# Patient Record
Sex: FEMALE | URBAN - METROPOLITAN AREA
[De-identification: names, ages, dates, MRNs, and addresses within clinical notes are randomized per-mention and may not be internally consistent; named-entity substitution may affect disease eponyms.]

---

## 2022-02-22 PROBLEM — O26.899 RH NEGATIVE STATE IN ANTEPARTUM PERIOD: Status: ACTIVE | Noted: 2022-02-22

## 2022-02-22 PROBLEM — Z67.91 RH NEGATIVE STATE IN ANTEPARTUM PERIOD: Status: ACTIVE | Noted: 2022-02-22

## 2022-02-22 PROBLEM — Z34.00 SUPERVISION OF NORMAL FIRST PREGNANCY, ANTEPARTUM (HCC): Status: ACTIVE | Noted: 2022-02-22

## 2022-02-22 PROBLEM — Z67.91 RH NEGATIVE STATE IN ANTEPARTUM PERIOD (HCC): Status: ACTIVE | Noted: 2022-02-22

## 2022-02-22 PROBLEM — Z34.00 SUPERVISION OF NORMAL FIRST PREGNANCY, ANTEPARTUM: Status: ACTIVE | Noted: 2022-02-22

## 2022-02-22 PROBLEM — O26.899 RH NEGATIVE STATE IN ANTEPARTUM PERIOD (HCC): Status: ACTIVE | Noted: 2022-02-22

## 2022-02-24 PROBLEM — O09.899 RUBELLA NON-IMMUNE STATUS, ANTEPARTUM: Status: ACTIVE | Noted: 2022-02-24

## 2022-02-24 PROBLEM — Z28.39 RUBELLA NON-IMMUNE STATUS, ANTEPARTUM: Status: ACTIVE | Noted: 2022-02-24

## 2022-02-24 PROBLEM — Z28.39 RUBELLA NON-IMMUNE STATUS, ANTEPARTUM (HCC): Status: ACTIVE | Noted: 2022-02-24

## 2022-02-24 PROBLEM — O09.899 RUBELLA NON-IMMUNE STATUS, ANTEPARTUM (HCC): Status: ACTIVE | Noted: 2022-02-24

## 2022-03-23 PROBLEM — U07.1 COVID-19 AFFECTING PREGNANCY, ANTEPARTUM: Status: ACTIVE | Noted: 2022-03-23

## 2022-03-23 PROBLEM — U07.1 COVID-19 AFFECTING PREGNANCY, ANTEPARTUM (HCC): Status: ACTIVE | Noted: 2022-03-23

## 2022-03-23 PROBLEM — O98.519 COVID-19 AFFECTING PREGNANCY, ANTEPARTUM (HCC): Status: ACTIVE | Noted: 2022-03-23

## 2022-03-23 PROBLEM — O98.519 COVID-19 AFFECTING PREGNANCY, ANTEPARTUM: Status: ACTIVE | Noted: 2022-03-23

## 2022-04-21 ENCOUNTER — HOSPITAL ENCOUNTER (OUTPATIENT)
Dept: PERINATAL CARE | Facility: HOSPITAL | Age: 32
Discharge: HOME | End: 2022-04-21
Attending: OBSTETRICS & GYNECOLOGY
Payer: COMMERCIAL

## 2022-04-21 VITALS
DIASTOLIC BLOOD PRESSURE: 76 MMHG | WEIGHT: 182 LBS | HEART RATE: 92 BPM | SYSTOLIC BLOOD PRESSURE: 118 MMHG | BODY MASS INDEX: 29 KG/M2

## 2022-04-21 DIAGNOSIS — O98.519 COVID-19 AFFECTING PREGNANCY, ANTEPARTUM: ICD-10-CM

## 2022-04-21 DIAGNOSIS — O98.519 COVID-19 AFFECTING PREGNANCY, ANTEPARTUM: Primary | ICD-10-CM

## 2022-04-21 DIAGNOSIS — U07.1 COVID-19 AFFECTING PREGNANCY, ANTEPARTUM: ICD-10-CM

## 2022-04-21 DIAGNOSIS — U07.1 COVID-19 AFFECTING PREGNANCY, ANTEPARTUM: Primary | ICD-10-CM

## 2022-04-21 PROCEDURE — 76811 OB US DETAILED SNGL FETUS: CPT | Performed by: OBSTETRICS & GYNECOLOGY

## 2022-07-11 ENCOUNTER — HOSPITAL ENCOUNTER (OUTPATIENT)
Dept: PERINATAL CARE | Facility: HOSPITAL | Age: 32
Discharge: HOME | End: 2022-07-11
Attending: OBSTETRICS & GYNECOLOGY
Payer: COMMERCIAL

## 2022-07-11 ENCOUNTER — HOSPITAL ENCOUNTER (OUTPATIENT)
Dept: PERINATAL CARE | Facility: HOSPITAL | Age: 32
End: 2022-07-11
Attending: OBSTETRICS & GYNECOLOGY
Payer: COMMERCIAL

## 2022-07-11 VITALS
HEART RATE: 91 BPM | WEIGHT: 196 LBS | SYSTOLIC BLOOD PRESSURE: 115 MMHG | BODY MASS INDEX: 31 KG/M2 | DIASTOLIC BLOOD PRESSURE: 76 MMHG

## 2022-07-11 DIAGNOSIS — O98.519 COVID-19 AFFECTING PREGNANCY, ANTEPARTUM: ICD-10-CM

## 2022-07-11 DIAGNOSIS — U07.1 COVID-19 AFFECTING PREGNANCY IN THIRD TRIMESTER: Primary | ICD-10-CM

## 2022-07-11 DIAGNOSIS — O98.513 COVID-19 AFFECTING PREGNANCY IN THIRD TRIMESTER: Primary | ICD-10-CM

## 2022-07-11 DIAGNOSIS — U07.1 COVID-19 AFFECTING PREGNANCY, ANTEPARTUM: ICD-10-CM

## 2022-07-11 PROCEDURE — 76816 OB US FOLLOW-UP PER FETUS: CPT | Performed by: OBSTETRICS & GYNECOLOGY

## 2022-07-11 PROCEDURE — 76819 FETAL BIOPHYS PROFIL W/O NST: CPT

## 2024-01-25 LAB
AMB EXT CHLAMYDIA, NUCLEIC ACID AMP: NOT DETECTED
AMB EXT GONOCOCCUS, NUCLEIC ACID AMP: NOT DETECTED
AMB EXT TREPONEMAL ANTIBODIES: NON REACTIVE
ANTIBODY SCREEN OB: NEGATIVE
HEPATITIS B SURFACE ANTIGEN OB: NEGATIVE
HIV RESULT OB: NEGATIVE
RH FACTOR OB: NEGATIVE

## 2024-06-03 LAB
AMB EXT TREPONEMAL ANTIBODIES: NON REACTIVE
HIV RESULT OB: NEGATIVE

## 2024-07-30 LAB — STREP GP B CULT OB: POSITIVE

## 2024-08-23 ENCOUNTER — TELEPHONE (OUTPATIENT)
Dept: OBGYN UNIT | Facility: HOSPITAL | Age: 34
End: 2024-08-23

## 2024-08-23 VITALS — WEIGHT: 212 LBS | BODY MASS INDEX: 32.89 KG/M2 | HEIGHT: 67.5 IN

## 2024-08-23 RX ORDER — CHOLECALCIFEROL (VITAMIN D3) 25 MCG
1 TABLET,CHEWABLE ORAL DAILY
COMMUNITY

## 2024-08-24 ENCOUNTER — ANESTHESIA EVENT (OUTPATIENT)
Dept: OBGYN UNIT | Facility: HOSPITAL | Age: 34
End: 2024-08-24
Payer: COMMERCIAL

## 2024-08-24 ENCOUNTER — HOSPITAL ENCOUNTER (INPATIENT)
Facility: HOSPITAL | Age: 34
LOS: 2 days | Discharge: HOME | End: 2024-08-26
Attending: OBSTETRICS & GYNECOLOGY | Admitting: OBSTETRICS & GYNECOLOGY
Payer: COMMERCIAL

## 2024-08-24 ENCOUNTER — ANESTHESIA (OUTPATIENT)
Dept: OBGYN UNIT | Facility: HOSPITAL | Age: 34
End: 2024-08-24
Payer: COMMERCIAL

## 2024-08-24 PROBLEM — Z34.90 PREGNANCY (HCC): Status: ACTIVE | Noted: 2024-08-24

## 2024-08-24 LAB
ANTIBODY SCREEN: NEGATIVE
BASOPHILS # BLD AUTO: 0.04 X10(3) UL (ref 0–0.2)
BASOPHILS NFR BLD AUTO: 0.3 %
DEPRECATED RDW RBC AUTO: 39.9 FL (ref 35.1–46.3)
EOSINOPHIL # BLD AUTO: 0.08 X10(3) UL (ref 0–0.7)
EOSINOPHIL NFR BLD AUTO: 0.7 %
ERYTHROCYTE [DISTWIDTH] IN BLOOD BY AUTOMATED COUNT: 13.1 % (ref 11–15)
FETAL SCREEN RESULT: NEGATIVE
HCT VFR BLD AUTO: 35.6 %
HGB BLD-MCNC: 12.1 G/DL
IMM GRANULOCYTES # BLD AUTO: 0.06 X10(3) UL (ref 0–1)
IMM GRANULOCYTES NFR BLD: 0.5 %
LYMPHOCYTES # BLD AUTO: 1.95 X10(3) UL (ref 1–4)
LYMPHOCYTES NFR BLD AUTO: 16.1 %
MCH RBC QN AUTO: 28.9 PG (ref 26–34)
MCHC RBC AUTO-ENTMCNC: 34 G/DL (ref 31–37)
MCV RBC AUTO: 85 FL
MONOCYTES # BLD AUTO: 0.6 X10(3) UL (ref 0.1–1)
MONOCYTES NFR BLD AUTO: 5 %
NEUTROPHILS # BLD AUTO: 9.38 X10 (3) UL (ref 1.5–7.7)
NEUTROPHILS # BLD AUTO: 9.38 X10(3) UL (ref 1.5–7.7)
NEUTROPHILS NFR BLD AUTO: 77.4 %
PLATELET # BLD AUTO: 167 10(3)UL (ref 150–450)
RBC # BLD AUTO: 4.19 X10(6)UL
RH BLOOD TYPE: NEGATIVE
RH BLOOD TYPE: NEGATIVE
T PALLIDUM AB SER QL IA: NONREACTIVE
WBC # BLD AUTO: 12.1 X10(3) UL (ref 4–11)

## 2024-08-24 PROCEDURE — 86901 BLOOD TYPING SEROLOGIC RH(D): CPT | Performed by: OBSTETRICS & GYNECOLOGY

## 2024-08-24 PROCEDURE — 85025 COMPLETE CBC W/AUTO DIFF WBC: CPT | Performed by: OBSTETRICS & GYNECOLOGY

## 2024-08-24 PROCEDURE — 86900 BLOOD TYPING SEROLOGIC ABO: CPT | Performed by: OBSTETRICS & GYNECOLOGY

## 2024-08-24 PROCEDURE — 86850 RBC ANTIBODY SCREEN: CPT | Performed by: OBSTETRICS & GYNECOLOGY

## 2024-08-24 PROCEDURE — 99214 OFFICE O/P EST MOD 30 MIN: CPT

## 2024-08-24 PROCEDURE — 86780 TREPONEMA PALLIDUM: CPT | Performed by: OBSTETRICS & GYNECOLOGY

## 2024-08-24 PROCEDURE — 0KQM0ZZ REPAIR PERINEUM MUSCLE, OPEN APPROACH: ICD-10-PCS | Performed by: OBSTETRICS & GYNECOLOGY

## 2024-08-24 PROCEDURE — 85461 HEMOGLOBIN FETAL: CPT | Performed by: OBSTETRICS & GYNECOLOGY

## 2024-08-24 RX ORDER — NALBUPHINE HYDROCHLORIDE 10 MG/ML
2.5 INJECTION, SOLUTION INTRAMUSCULAR; INTRAVENOUS; SUBCUTANEOUS
Status: DISCONTINUED | OUTPATIENT
Start: 2024-08-24 | End: 2024-08-24

## 2024-08-24 RX ORDER — CHOLECALCIFEROL (VITAMIN D3) 25 MCG
1 TABLET,CHEWABLE ORAL DAILY
Status: DISCONTINUED | OUTPATIENT
Start: 2024-08-24 | End: 2024-08-26

## 2024-08-24 RX ORDER — CITRIC ACID/SODIUM CITRATE 334-500MG
30 SOLUTION, ORAL ORAL AS NEEDED
Status: DISCONTINUED | OUTPATIENT
Start: 2024-08-24 | End: 2024-08-24 | Stop reason: HOSPADM

## 2024-08-24 RX ORDER — SIMETHICONE 80 MG
80 TABLET,CHEWABLE ORAL 3 TIMES DAILY PRN
Status: DISCONTINUED | OUTPATIENT
Start: 2024-08-24 | End: 2024-08-26

## 2024-08-24 RX ORDER — ACETAMINOPHEN 500 MG
1000 TABLET ORAL EVERY 6 HOURS PRN
Status: DISCONTINUED | OUTPATIENT
Start: 2024-08-24 | End: 2024-08-26

## 2024-08-24 RX ORDER — LIDOCAINE HYDROCHLORIDE 10 MG/ML
INJECTION, SOLUTION INFILTRATION; PERINEURAL
Status: COMPLETED | OUTPATIENT
Start: 2024-08-24 | End: 2024-08-24

## 2024-08-24 RX ORDER — ONDANSETRON 2 MG/ML
4 INJECTION INTRAMUSCULAR; INTRAVENOUS EVERY 6 HOURS PRN
Status: DISCONTINUED | OUTPATIENT
Start: 2024-08-24 | End: 2024-08-24 | Stop reason: HOSPADM

## 2024-08-24 RX ORDER — AMMONIA INHALANTS 0.04 G/.3ML
0.3 INHALANT RESPIRATORY (INHALATION) AS NEEDED
Status: DISCONTINUED | OUTPATIENT
Start: 2024-08-24 | End: 2024-08-26

## 2024-08-24 RX ORDER — IBUPROFEN 600 MG/1
600 TABLET, FILM COATED ORAL ONCE AS NEEDED
Status: DISCONTINUED | OUTPATIENT
Start: 2024-08-24 | End: 2024-08-24 | Stop reason: HOSPADM

## 2024-08-24 RX ORDER — DEXTROSE, SODIUM CHLORIDE, SODIUM LACTATE, POTASSIUM CHLORIDE, AND CALCIUM CHLORIDE 5; .6; .31; .03; .02 G/100ML; G/100ML; G/100ML; G/100ML; G/100ML
INJECTION, SOLUTION INTRAVENOUS CONTINUOUS
Status: DISCONTINUED | OUTPATIENT
Start: 2024-08-24 | End: 2024-08-24 | Stop reason: HOSPADM

## 2024-08-24 RX ORDER — LIDOCAINE HYDROCHLORIDE 10 MG/ML
30 INJECTION, SOLUTION EPIDURAL; INFILTRATION; INTRACAUDAL; PERINEURAL ONCE
Status: DISCONTINUED | OUTPATIENT
Start: 2024-08-24 | End: 2024-08-24 | Stop reason: HOSPADM

## 2024-08-24 RX ORDER — LIDOCAINE HYDROCHLORIDE AND EPINEPHRINE 15; 5 MG/ML; UG/ML
INJECTION, SOLUTION EPIDURAL
Status: COMPLETED | OUTPATIENT
Start: 2024-08-24 | End: 2024-08-24

## 2024-08-24 RX ORDER — ACETAMINOPHEN 500 MG
500 TABLET ORAL EVERY 6 HOURS PRN
Status: DISCONTINUED | OUTPATIENT
Start: 2024-08-24 | End: 2024-08-26

## 2024-08-24 RX ORDER — BUPIVACAINE HYDROCHLORIDE 2.5 MG/ML
20 INJECTION, SOLUTION EPIDURAL; INFILTRATION; INTRACAUDAL ONCE
Status: DISCONTINUED | OUTPATIENT
Start: 2024-08-24 | End: 2024-08-24

## 2024-08-24 RX ORDER — BUPIVACAINE HYDROCHLORIDE 2.5 MG/ML
INJECTION, SOLUTION EPIDURAL; INFILTRATION; INTRACAUDAL
Status: COMPLETED | OUTPATIENT
Start: 2024-08-24 | End: 2024-08-24

## 2024-08-24 RX ORDER — BUPIVACAINE HCL/0.9 % NACL/PF 0.25 %
5 PLASTIC BAG, INJECTION (ML) EPIDURAL AS NEEDED
Status: DISCONTINUED | OUTPATIENT
Start: 2024-08-24 | End: 2024-08-24

## 2024-08-24 RX ORDER — IBUPROFEN 600 MG/1
600 TABLET, FILM COATED ORAL EVERY 6 HOURS
Status: DISCONTINUED | OUTPATIENT
Start: 2024-08-25 | End: 2024-08-26

## 2024-08-24 RX ORDER — DOCUSATE SODIUM 100 MG/1
100 CAPSULE, LIQUID FILLED ORAL 2 TIMES DAILY
Status: DISCONTINUED | OUTPATIENT
Start: 2024-08-24 | End: 2024-08-26

## 2024-08-24 RX ORDER — TERBUTALINE SULFATE 1 MG/ML
0.25 INJECTION, SOLUTION SUBCUTANEOUS AS NEEDED
Status: DISCONTINUED | OUTPATIENT
Start: 2024-08-24 | End: 2024-08-24 | Stop reason: HOSPADM

## 2024-08-24 RX ORDER — ACETAMINOPHEN 500 MG
500 TABLET ORAL EVERY 6 HOURS PRN
Status: DISCONTINUED | OUTPATIENT
Start: 2024-08-24 | End: 2024-08-24 | Stop reason: HOSPADM

## 2024-08-24 RX ORDER — BUPIVACAINE HYDROCHLORIDE 2.5 MG/ML
20 INJECTION, SOLUTION EPIDURAL; INFILTRATION; INTRACAUDAL ONCE
Status: DISCONTINUED | OUTPATIENT
Start: 2024-08-24 | End: 2024-08-24 | Stop reason: HOSPADM

## 2024-08-24 RX ORDER — BENZOCAINE/MENTHOL 6 MG-10 MG
1 LOZENGE MUCOUS MEMBRANE EVERY 6 HOURS PRN
Status: DISCONTINUED | OUTPATIENT
Start: 2024-08-24 | End: 2024-08-26

## 2024-08-24 RX ORDER — BISACODYL 10 MG
10 SUPPOSITORY, RECTAL RECTAL ONCE AS NEEDED
Status: DISCONTINUED | OUTPATIENT
Start: 2024-08-24 | End: 2024-08-26

## 2024-08-24 RX ORDER — SODIUM CHLORIDE, SODIUM LACTATE, POTASSIUM CHLORIDE, CALCIUM CHLORIDE 600; 310; 30; 20 MG/100ML; MG/100ML; MG/100ML; MG/100ML
INJECTION, SOLUTION INTRAVENOUS AS NEEDED
Status: DISCONTINUED | OUTPATIENT
Start: 2024-08-24 | End: 2024-08-24 | Stop reason: HOSPADM

## 2024-08-24 RX ORDER — ACETAMINOPHEN 500 MG
1000 TABLET ORAL EVERY 6 HOURS PRN
Status: DISCONTINUED | OUTPATIENT
Start: 2024-08-24 | End: 2024-08-24 | Stop reason: HOSPADM

## 2024-08-24 RX ORDER — ONDANSETRON 2 MG/ML
4 INJECTION INTRAMUSCULAR; INTRAVENOUS EVERY 6 HOURS PRN
Status: DISCONTINUED | OUTPATIENT
Start: 2024-08-24 | End: 2024-08-26

## 2024-08-24 RX ADMIN — LIDOCAINE HYDROCHLORIDE 5 ML: 10 INJECTION, SOLUTION INFILTRATION; PERINEURAL at 15:42:00

## 2024-08-24 RX ADMIN — BUPIVACAINE HYDROCHLORIDE 5 ML: 2.5 INJECTION, SOLUTION EPIDURAL; INFILTRATION; INTRACAUDAL at 15:42:00

## 2024-08-24 RX ADMIN — LIDOCAINE HYDROCHLORIDE AND EPINEPHRINE 5 ML: 15; 5 INJECTION, SOLUTION EPIDURAL at 15:42:00

## 2024-08-24 NOTE — PROGRESS NOTES
Pt is a 34 year old female admitted to 377/[facility].     Chief Complaint   Patient presents with    Laboring      Pt is  39w6d intra-uterine pregnancy.  History obtained, consents signed. Oriented to room, staff, and plan of care.

## 2024-08-24 NOTE — ANESTHESIA PREPROCEDURE EVALUATION
Anesthesia PreOp Note    HPI:     Cristina Saravia is a 34 year old female who presents for preoperative consultation requested by: * No surgeons listed *    Date of Surgery: 8/24/2024    * No procedures listed *  Indication: * No pre-op diagnosis entered *    Relevant Problems   No relevant active problems       NPO:                         History Review:  Patient Active Problem List    Diagnosis Date Noted   • Pregnancy ([facility]) 08/24/2024   • COVID-19 affecting pregnancy, antepartum ([facility]) 03/23/2022   • Rubella non-immune status, antepartum ([facility]) 02/24/2022   • Supervision of normal first pregnancy, antepartum ([facility]) 02/22/2022   • Rh negative state in antepartum period ([facility]) 02/22/2022       Past Medical History:   • Allergic rhinitis   • COVID-19       Past Surgical History:   Procedure Laterality Date   • [place] teeth removed  2010       Medications Prior to Admission   Medication Sig Dispense Refill Last Dose   • prenatal vitamin with DHA 27-0.8-228 MG Oral Cap Take 1 capsule by mouth daily.   8/23/2024   • IRON OR    8/23/2024   • Calcium Carbonate (CALCIUM 500 OR) Take by mouth.      • Docosahexaenoic Acid (PRENATAL DHA) 200 MG Oral Cap Use as directed 1 Dose in the mouth or throat daily.        Current Facility-Administered Medications Ordered in Epic   Medication Dose Route Frequency Provider Last Rate Last Admin   • dextrose in lactated ringers 5% infusion   Intravenous Continuous Mary Alice Mittal MD       • lactated ringers infusion   Intravenous PRN Mary Alice Mittal  mL/hr at 08/24/24 1430 New Bag at 08/24/24 1430   • lactated ringers IV bolus 500 mL  500 mL Intravenous PRN Mary Alice Mittal MD       • acetaminophen (Tylenol Extra Strength) tab 500 mg  500 mg Oral Q6H PRN Mary Alice Mittal MD       • acetaminophen (Tylenol Extra Strength) tab 1,000 mg  1,000 mg Oral Q6H PRN Mary Alice Mittal MD       • ibuprofen (Motrin) tab 600 mg  600 mg Oral Once PRN Mary Alice Mittal MD       • ondansetron  (Zofran) 4 MG/2ML injection 4 mg  4 mg Intravenous Q6H PRN Mary Alice Mittal MD       • oxyTOCIN in sodium chloride 0.9% (Pitocin) 30 Units/500mL infusion premix  62.5-900 rené-units/min Intravenous Continuous Mary Alice Mittal MD       • terbutaline (Brethine) 1 MG/ML injection 0.25 mg  0.25 mg Subcutaneous PRN Mary Alice Mittal MD       • sodium citrate-citric acid (Bicitra) 500-334 MG/5ML oral solution 30 mL  30 mL Oral PRN Mary Alice Mittal MD       • lidocaine PF (Xylocaine-MPF) 1% injection  30 mL Intradermal Once Mary Alice Mittal MD       • ampicillin (Omnipen) 1 g in sodium chloride 0.9% 100 mL IVPB-MBP  1 g Intravenous Q4H Mary Alice Mittal MD       • lactated ringers IV bolus 1,000 mL  1,000 mL Intravenous Once Ren Jimenez MD       • fentaNYL-bupivacaine 2 mcg/mL-0.125% in sodium chloride 0.9% 100 mL EPIDURAL infusion premix   Epidural Continuous Ren Jimenez MD       • fentaNYL (Sublimaze) 50 mcg/mL injection 100 mcg  100 mcg Epidural Once Ren Jimenez MD       • bupivacaine PF (Marcaine) 0.25% injection  20 mL Epidural Once Ren Jimenez MD       • EPHEDrine (PF) 25 MG/5 ML injection 5 mg  5 mg Intravenous PRN Ren Jimenez MD       • nalbuphine (Nubain) 10 mg/mL injection 2.5 mg  2.5 mg Intravenous Q15 Min PRN Ren Jimenez MD         No current [facility]-ordered outpatient medications on file.       No Known Allergies    Family History   Problem Relation Age of Onset   • Diabetes Mother         type 2 dm   • No Known Problems Father    • Heart Disorder Maternal Grandmother         hole in heart/on bld thinners/a fib   • Other (Scoliosis) Paternal Grandmother    • Prostate Cancer Paternal Grandfather    • No Known Problems Sister    • No Known Problems Brother    • Other (Scoliosis) Paternal Aunt      Social History     Socioeconomic History   • Marital status:    Tobacco Use   • Smoking status: Never   • Smokeless tobacco: Never   Vaping Use   • Vaping status: Never Used    Substance and Sexual Activity   • Alcohol use: Yes     Comment: socially   • Drug use: Never       Available pre-op labs reviewed.  Lab Results   Component Value Date    WBC 12.1 (H) 08/24/2024    RBC 4.19 08/24/2024    HGB 12.1 08/24/2024    HCT 35.6 08/24/2024    MCV 85.0 08/24/2024    MCH 28.9 08/24/2024    MCHC 34.0 08/24/2024    RDW 13.1 08/24/2024    .0 08/24/2024             Vital Signs:  There is no height or weight on file to calculate BMI.   oral temperature is 98.5 °F (36.9 °C). Her blood pressure is 126/85 and her pulse is 95. Her oxygen saturation is 98%.   Vitals:    08/24/24 1312 08/24/24 1315 08/24/24 1530 08/24/24 1545   BP:  126/65 123/83 126/85   Pulse:  94 89 95   Temp: 98.2 °F (36.8 °C)   98.5 °F (36.9 °C)   TempSrc: Oral   Oral   SpO2:   100% 98%        Anesthesia Evaluation     Patient summary reviewed and Nursing notes reviewed    Airway   Mallampati: I  TM distance: >3 FB  Neck ROM: full  Dental - Dentition appears grossly intact     Pulmonary - negative ROS and normal exam   Cardiovascular - negative ROS and normal exam    Neuro/Psych - negative ROS     GI/Hepatic/Renal - negative ROS     Endo/Other - negative ROS   Abdominal  - normal exam               Anesthesia Plan:   ASA:  2  Plan:   Epidural  Informed Consent Plan and Risks Discussed With:  Patient    I have informed [facility] and/or legal guardian or family member of the nature of the anesthetic plan, benefits, risks including possible dental damage if relevant, major complications, and any alternative forms of anesthetic management.   All of the patient's questions were answered to the best of my ability. The patient desires the anesthetic management as planned.  ISABEL MAST MD  8/24/2024 3:59 PM  Present on Admission:  **None**

## 2024-08-24 NOTE — ANESTHESIA PROCEDURE NOTES
Labor Analgesia    Date/Time: 8/24/2024 3:42 PM    Performed by: Ren Jimenez MD  Authorized by: Ren Jimenez MD      General Information and Staff    Start Time:  8/24/2024 3:42 PM  End Time:  8/24/2024 4:01 PM  Anesthesiologist:  Ren Jimenez MD  Performed by:  Anesthesiologist  Patient Location:  OB  Reason for Block: labor epidural    Preanesthetic Checklist: patient identified, IV checked, site marked, risks and benefits discussed, monitors and equipment checked, pre-op evaluation, timeout performed, anesthesia consent and sterile technique used      Procedure Details    Patient Position:  Sitting  Prep: ChloraPrep    Monitoring:  Heart rate  Approach:  Midline    Epidural Needle    Injection Technique:  TEOFILO air  Needle Type:  Tuohy  Needle Gauge:  18 G  Needle Length:  3.5 in  Location:  L2-3    Spinal Needle      Catheter    Catheter Type:  Multi-orifice  Catheter Size:  20 G  Catheter at Skin Depth:  13  Test Dose:  Negative    Assessment    Sensory Level:  T6    Additional Comments     Motor intact

## 2024-08-24 NOTE — H&P
[facility]  part of [facility]    History & Physical    Cristina Saravia Patient Status:  Inpatient    1990 MRN [id number]   Location [facility] Attending Mary Alice Mittal MD   Hosp Day # 0 PCP Fina Berg MD     Date of Admission:  2024      HPI:   Cristina Saravia is a 34 year old  female, current EGA of 39w6d with an estimated date of delivery of: 2024, by Last Menstrual Period who presents due to SROM    Being admitted for labor management.      Pt presents with complaints of leakage of fluid that was noted at 0500am.  She reports feeling contractions but tolerable.      Pt denies N/V/F/C/CP/SOB, HA, blurry vision, dizziness, RUQ pain, ctx, lof, VB.    Her current obstetrical history is significant for GBS colonizer, Rh Negative.    Patient Active Problem List   Diagnosis    Supervision of normal first pregnancy, antepartum ([facility])    Rh negative state in antepartum period ([facility])    Rubella non-immune status, antepartum ([facility])    COVID-19 affecting pregnancy, antepartum ([facility])    Pregnancy ([facility])         Fetal Movement reported as good.  GBS positive.   Rh negative.    History   Obstetric History:   OB History    Para Term  AB Living   2 1 1 0 0 1   SAB IAB Ectopic Multiple Live Births   0 0 0 0 1      # Outcome Date GA Lbr [place]/2nd Weight Sex Type Anes PTL Lv   2 Current            1 Term 22 40w3d  7 lb 7 oz (3.374 kg) F NORMAL SPONT EPI  JEANINE       Gyne History:   Last pap smear: 2021: Negative cytology      Past Medical History:   Past Medical History:    Allergic rhinitis    COVID-19       Past Surgerical History:   Past Surgical History:   Procedure Laterality Date    [place] teeth removed         Social History:   Social History     Tobacco Use    Smoking status: Never    Smokeless tobacco: Never   Substance Use Topics    Alcohol use: Yes     Comment: socially        Allergies/Medications:   Allergies:   No  Known Allergies    Medications:  Medications Prior to Admission   Medication Sig Dispense Refill Last Dose    prenatal vitamin with DHA 27-0.8-228 MG Oral Cap Take 1 capsule by mouth daily.   8/23/2024    IRON OR    8/23/2024    Calcium Carbonate (CALCIUM 500 OR) Take by mouth.       Docosahexaenoic Acid (PRENATAL DHA) 200 MG Oral Cap Use as directed 1 Dose in the mouth or throat daily.            Review of Systems:   As documented in HPI      Physical Exam:   Temp:  [97.7 °F (36.5 °C)-98.2 °F (36.8 °C)] 98.2 °F (36.8 °C)  Pulse:  [94-97] 94  BP: (118-126)/(65-93) 126/65    Constitutional: alert and cooperative in mild distress    Abdomen: soft,  nontender, gravid    Vaginal exam: Per RN  Dilation: 4 cm    Effacement: 50 %    Station: -2    Ferning (+)    FHT assessment:   Baseline: 145 bpm   Variability: moderate   Accels:  present   Decels: variables   Tocos:  contractions every 3-4 minute   Category: 2 tracing    Neurologic: Alert and oriented  Psychiatric: Cooperative    Results:     Recent Results (from the past 24 hour(s))   CBC With Differential With Platelet    Collection Time: 08/24/24  1:51 PM   Result Value Ref Range    WBC 12.1 (H) 4.0 - 11.0 x10(3) uL    RBC 4.19 3.80 - 5.30 x10(6)uL    HGB 12.1 12.0 - 16.0 g/dL    HCT 35.6 35.0 - 48.0 %    MCV 85.0 80.0 - 100.0 fL    MCH 28.9 26.0 - 34.0 pg    MCHC 34.0 31.0 - 37.0 g/dL    RDW-SD 39.9 35.1 - 46.3 fL    RDW 13.1 11.0 - 15.0 %    .0 150.0 - 450.0 10(3)uL    Neutrophil Absolute Prelim 9.38 (H) 1.50 - 7.70 x10 (3) uL    Neutrophil Absolute 9.38 (H) 1.50 - 7.70 x10(3) uL    Lymphocyte Absolute 1.95 1.00 - 4.00 x10(3) uL    Monocyte Absolute 0.60 0.10 - 1.00 x10(3) uL    Eosinophil Absolute 0.08 0.00 - 0.70 x10(3) uL    Basophil Absolute 0.04 0.00 - 0.20 x10(3) uL    Immature Granulocyte Absolute 0.06 0.00 - 1.00 x10(3) uL    Neutrophil % 77.4 %    Lymphocyte % 16.1 %    Monocyte % 5.0 %    Eosinophil % 0.7 %    Basophil % 0.3 %    Immature  Granulocyte % 0.5 %   T Pallidum Screening Cascade    Collection Time: 24  1:51 PM   Result Value Ref Range    Treponemal Antibodies Nonreactive Nonreactive    ABORH (Blood Type)    Collection Time: 24  1:51 PM   Result Value Ref Range    ABO BLOOD TYPE B     RH BLOOD TYPE Negative    Antibody Screen    Collection Time: 24  1:51 PM   Result Value Ref Range    Antibody Screen Negative        No results found.        Assessment/Plan:   IUP 39w6d  in / with SROM    Obstetrical history significant for GBS colonizer, Rh Negative.   Patient Active Problem List   Diagnosis    Supervision of normal first pregnancy, antepartum ([facility])    Rh negative state in antepartum period ([facility])    Rubella non-immune status, antepartum ([facility])    COVID-19 affecting pregnancy, antepartum ([facility])    Pregnancy ([facility])         Treatment Plan:  Expectant management.    Cristina Saravia is a 34 year old  female, current EGA of 39w6d who presents for admission due to SROM    Risks, benefits, alternatives and possible complications have been discussed in detail with the patient.   Pre-admission, admission, and post admission procedures and expectations were discussed in detail.    All questions answered; all appropriate consents will be signed at the Hospital.    IUP at 39w6d  Fetal heart tones category 2  Labor: SROM at 0500; admit, routine labs, expectant management, epidural if patient desires.  Pt to receive first dose of abx then will evaluate ctx if spacing apart will consider starting Pitocin.  GBS positive--> abx for GBS Ppx  Rh negative: Rhogam per protocol  CPM      Mary Alice Mittal MD  2024  3:24 PM

## 2024-08-25 LAB
BASOPHILS # BLD AUTO: 0.07 X10(3) UL (ref 0–0.2)
BASOPHILS NFR BLD AUTO: 0.5 %
DEPRECATED RDW RBC AUTO: 39.2 FL (ref 35.1–46.3)
EOSINOPHIL # BLD AUTO: 0.1 X10(3) UL (ref 0–0.7)
EOSINOPHIL NFR BLD AUTO: 0.6 %
ERYTHROCYTE [DISTWIDTH] IN BLOOD BY AUTOMATED COUNT: 12.8 % (ref 11–15)
HCT VFR BLD AUTO: 30.5 %
HGB BLD-MCNC: 10.8 G/DL
IMM GRANULOCYTES # BLD AUTO: 0.08 X10(3) UL (ref 0–1)
IMM GRANULOCYTES NFR BLD: 0.5 %
LYMPHOCYTES # BLD AUTO: 2.26 X10(3) UL (ref 1–4)
LYMPHOCYTES NFR BLD AUTO: 14.5 %
MCH RBC QN AUTO: 29.8 PG (ref 26–34)
MCHC RBC AUTO-ENTMCNC: 35.4 G/DL (ref 31–37)
MCV RBC AUTO: 84.3 FL
MONOCYTES # BLD AUTO: 1.04 X10(3) UL (ref 0.1–1)
MONOCYTES NFR BLD AUTO: 6.7 %
NEUTROPHILS # BLD AUTO: 12 X10 (3) UL (ref 1.5–7.7)
NEUTROPHILS # BLD AUTO: 12 X10(3) UL (ref 1.5–7.7)
NEUTROPHILS NFR BLD AUTO: 77.2 %
PLATELET # BLD AUTO: 158 10(3)UL (ref 150–450)
RBC # BLD AUTO: 3.62 X10(6)UL
WBC # BLD AUTO: 15.6 X10(3) UL (ref 4–11)

## 2024-08-25 PROCEDURE — 85025 COMPLETE CBC W/AUTO DIFF WBC: CPT | Performed by: OBSTETRICS & GYNECOLOGY

## 2024-08-25 PROCEDURE — 3E0334Z INTRODUCTION OF SERUM, TOXOID AND VACCINE INTO PERIPHERAL VEIN, PERCUTANEOUS APPROACH: ICD-10-PCS | Performed by: OBSTETRICS & GYNECOLOGY

## 2024-08-25 RX ORDER — IBUPROFEN 600 MG/1
600 TABLET, FILM COATED ORAL EVERY 6 HOURS PRN
Qty: 32 TABLET | Refills: 0 | Status: SHIPPED | OUTPATIENT
Start: 2024-08-25

## 2024-08-25 NOTE — PROGRESS NOTES
[facility]  part of [facility]    OB/Gyne Post  Progress Note      Cristina Saravia Patient Status:  Inpatient    1990 MRN [id number]   Location [facility] 3SE Attending Mary Alice Mittal MD   Hosp Day # 0 PCP Fina Berg MD       Subjective     Pt denies N/V/F/C/CP/SOB/palpitations, dizziness, headache, blurry vision, leg pain/calf pain.       Good pain control.   Tolerating present diet.   Ambulating well. Voiding freely.  Breastfeeding: Yes   Vaginal bleeding: Decreasing     Objective   Vital signs in last 24 hours:  Temp:  [97.7 °F (36.5 °C)-98.5 °F (36.9 °C)] 98.1 °F (36.7 °C)  Pulse:  [74-99] 79  Resp:  [16] 16  BP: ()/(52-93) 115/76  SpO2:  [96 %-100 %] 97 %    Input/Output:    Intake/Output Summary (Last 24 hours) at 20247  Last data filed at 2024 2148  Gross per 24 hour   Intake --   Output 1400 ml   Net -1400 ml         Constitutional: NAD  Abdomen: soft, nontender, nondistended  Uterus: fundus firm and 1 cm below umbilicus,   Extremities: No calf tenderness; no c/c/e      Results:   Labs / Path / Radiology:    Recent Results (from the past 24 hour(s))   CBC With Differential With Platelet    Collection Time: 24  1:51 PM   Result Value Ref Range    WBC 12.1 (H) 4.0 - 11.0 x10(3) uL    RBC 4.19 3.80 - 5.30 x10(6)uL    HGB 12.1 12.0 - 16.0 g/dL    HCT 35.6 35.0 - 48.0 %    MCV 85.0 80.0 - 100.0 fL    MCH 28.9 26.0 - 34.0 pg    MCHC 34.0 31.0 - 37.0 g/dL    RDW-SD 39.9 35.1 - 46.3 fL    RDW 13.1 11.0 - 15.0 %    .0 150.0 - 450.0 10(3)uL    Neutrophil Absolute Prelim 9.38 (H) 1.50 - 7.70 x10 (3) uL    Neutrophil Absolute 9.38 (H) 1.50 - 7.70 x10(3) uL    Lymphocyte Absolute 1.95 1.00 - 4.00 x10(3) uL    Monocyte Absolute 0.60 0.10 - 1.00 x10(3) uL    Eosinophil Absolute 0.08 0.00 - 0.70 x10(3) uL    Basophil Absolute 0.04 0.00 - 0.20 x10(3) uL    Immature Granulocyte Absolute 0.06 0.00 - 1.00 x10(3) uL    Neutrophil % 77.4 %     Lymphocyte % 16.1 %    Monocyte % 5.0 %    Eosinophil % 0.7 %    Basophil % 0.3 %    Immature Granulocyte % 0.5 %   T Pallidum Screening Cascade    Collection Time: 24  1:51 PM   Result Value Ref Range    Treponemal Antibodies Nonreactive Nonreactive    ABORH (Blood Type)    Collection Time: 24  1:51 PM   Result Value Ref Range    ABO BLOOD TYPE B     RH BLOOD TYPE Negative    Antibody Screen    Collection Time: 24  1:51 PM   Result Value Ref Range    Antibody Screen Negative    ABORH Confirmation    Collection Time: 24  2:35 PM   Result Value Ref Range    ABO BLOOD TYPE B     RH BLOOD TYPE Negative    Fetal Screen    Collection Time: 24  8:41 PM   Result Value Ref Range    Fetal Screen Result Negative    Rh Immune Globulin (Product Only) Once    Collection Time: 24  9:59 PM   Result Value Ref Range    DERIVATIVE CODE RHG     DERIVATIVE LOT [id number]     UNIT ABO      Product Status Cross Matched        Specimens (From admission, onward)      None            No results found.      Assessment/Plan   34 year oldyo  , s/p spontaneous vaginal, PPD# 1       Patient Active Problem List   Diagnosis    Supervision of normal first pregnancy, antepartum ([facility])    Rh negative state in antepartum period ([facility])    Rubella non-immune status, antepartum ([facility])    COVID-19 affecting pregnancy, antepartum ([facility])    Pregnancy ([facility])   .    Postpartum:  -Pt doing well  -Pain tolerable and controlled  -Breastfeeding, lactation consultant available fo assistance    2. Anemia:  Hgb s/p delivery 10.8  Most likelly 2/2 delivery  Asymptomatic and hemodynamically stable  Will encourage cont PNV and increase intake of iron rich foods    3. Rh negative  Rhogam per protocol.      4. Disposition:  discharge home with discharge instructions reviewed in detail  Pt comfortable about going home, discharge home today  Rx for motrin given  Home instructions given and pt verbalized understanding  Pt to call the  office and schedule an appt in 6 wks for PP visit  If any concerns or questions arise, pt to call the office and make an appt sooner for reevaluation.        Mary Alice Mittal MD  8/25/2024

## 2024-08-25 NOTE — PROGRESS NOTES
Patient up to bathroom with assist x 2.  Voided 400 at this time. Patient transferred to mother/baby room 350 per wheelchair in stable condition with baby and personal belongings.  Accompanied by significant other and staff.  Report given to mother/baby BENITEZ Bull.

## 2024-08-25 NOTE — DISCHARGE SUMMARY
[facility]  part of [facility]    Discharge Summary    [facility] Patient Status:  Inpatient    1990 MRN [id number]   Location [facility] Attending Gemma Mittal MD   Hosp Day # 1 PCP Fina Berg MD     Date of Admission: 2024    Admission Diagnoses: pregnant  Pregnancy ([facility])    Date of Discharge: 2024    Discharge Diagnoses:S/P Vaginal Delivery    Episode Diagnoses:   pregnancy Problems (from 24 to present)       No problems associated with this episode.                  Hospital Course:     EDC: Estimated Date of Delivery: 24    Gestational Age: 39w6d    Date of Delivery: 2024   Time of Delivery: 7:39 PM     Antepartum complications:  Rh negative and GBS (+)    Delivered By: GEMMA MITTAL     Delivery Method: Normal spontaneous vaginal delivery     Delivery Procedures:     Baby: female       Apgars:  1 minute:   9                  5 minutes: 9                           10 minutes:      Feeding Method:It is unknown whether the patient is currently breastfeeding.     Intrapartum Complications: Pt arrived to triage with leakage of fluid.  Ferning was positive.  She was found to be 4cm dilated.  She was admitted for labor management.  She received epidural.  Pitocin was started and went up to 4 mu/min.  Pt progressed to complete and underwent an uncomplicated .  Upon delivery of the placenta a true knot was seen on the cord.      Lacerations      Perineal lacerations: 2nd Repaired?: Yes     Vaginal laceration?: No      Cervical laceration?: No    Clitoral laceration?: No             Episiotomy: None     Placenta: Spontaneous     Postpartum complications: Since Rh negative, she received Rhogam per protocol.  Anemia    Injections/Vaccines (last 240 hours)       Date/Time Action Medication Dose    24 0038 Given    Rho D immune globulin (Rhophylac) 1500 Units/2mL injection 300 mcg 300 mcg            Previous  Encounter Meds                     Rho D immune globulin (Rhophylac) 1500 Units/2mL injection 300 mcg (mcg) 300 mcg Given 08/25/24 0038                        Discharge Plan:   Discharge Condition: Good    Discharge medications: Ibuprofen prescribed.  Current Discharge Medication List        Home Meds - Unchanged    Details   prenatal vitamin with DHA 27-0.8-228 MG Oral Cap Take 1 capsule by mouth daily.      IRON OR       Calcium Carbonate (CALCIUM 500 OR) Take by mouth.      Docosahexaenoic Acid (PRENATAL DHA) 200 MG Oral Cap Use as directed 1 Dose in the mouth or throat daily.                   Discharge Diet: General diet    Discharge Activity: Pelvic rest until cleared Further discharge instructions were provided to the patient.    Follow up:     Follow Up in the Office: 6 weeks for postpartum visit     Follow-up Information       Mary Alice Mittal MD Follow up in 6 week(s).    Specialty: OBSTETRICS & GYNECOLOGY  Why: Call the office to schedule an appointment in 6 weeks.  If you have any questions or concerns call the office.  Contact information:  [address] [facility]  [address] [place] [id number] [phone]                                     Mary Alice Mittal MD  8/25/2024

## 2024-08-25 NOTE — PROGRESS NOTES
Received patient into room via wheelchair and accompanied by significant other as well as nursing staff.  Pt transferred to bed. VSS WNL. ID bands matched with L&D RN. Patient oriented to unit, room, and call light within reach. POC reviewed with patient. Instructed to call for assistance when ready to void. Report received from Elise MARQUIS.

## 2024-08-25 NOTE — PLAN OF CARE
Problem: PAIN - ADULT  Goal: Verbalizes/displays adequate comfort level or patient's stated pain goal  Description: INTERVENTIONS:  - Encourage pt to monitor pain and request assistance  - Assess pain using appropriate pain scale  - Administer analgesics based on type and severity of pain and evaluate response  - Implement non-pharmacological measures as appropriate and evaluate response  - Consider cultural and social influences on pain and pain management  - Manage/alleviate anxiety  - Utilize distraction and/or relaxation techniques  - Monitor for opioid side effects  - Notify MD/LIP if interventions unsuccessful or patient reports new pain  - Anticipate increased pain with activity and pre-medicate as appropriate  Outcome: Progressing     Problem: POSTPARTUM  Goal: Long Term Goal:Experiences normal postpartum course  Description: INTERVENTIONS:  - Assess and monitor vital signs and lab values.  - Assess fundus and lochia.  - Provide ice/sitz baths for perineum discomfort.  - Monitor healing of incision/episiotomy/laceration, and assess for signs and symptoms of infection and hematoma.  - Assess bladder function and monitor for bladder distention.  - Provide/instruct/assist with pericare as needed.  - Provide VTE prophylaxis as needed.  - Monitor bowel function.  - Encourage ambulation and provide assistance as needed.  - Assess and monitor emotional status and provide social service/psych resources as needed.  - Utilize standard precautions and use personal protective equipment as indicated. Ensure aseptic care of all intravenous lines and invasive tubes/drains.  - Obtain immunization and exposure to communicable diseases history.  Outcome: Progressing  Goal: Optimize infant feeding at the breast  Description: INTERVENTIONS:  - Initiate breast feeding within first hour after birth.   - Monitor effectiveness of current breast feeding efforts.  - Assess support systems available to mother/family.  - Identify  cultural beliefs/practices regarding lactation, letdown techniques, maternal food preferences.  - Assess mother's knowledge and previous experience with breast feeding.  - Provide information as needed about early infant feeding cues (e.g., rooting, lip smacking, sucking fingers/hand) versus late cue of crying.  - Discuss/demonstrate breast feeding aids (e.g., infant sling, nursing footstool/pillows, and breast pumps).  - Encourage mother/other family members to express feelings/concerns, and actively listen.  - Educate father/SO about benefits of breast feeding and how to manage common lactation challenges.  - Recommend avoidance of specific medications or substances incompatible with breast feeding.  - Assess and monitor for signs of nipple pain/trauma.  - Instruct and provide assistance with proper latch.  - Review techniques for milk expression (breast pumping) and storage of breast milk. Provide pumping equipment/supplies, instructions and assistance, as needed.  - Encourage rooming-in and breast feeding on demand.  - Encourage skin-to-skin contact.  - Provide LC support as needed.  - Assess for and manage engorgement.  - Provide breast feeding education handouts and information on community breast feeding support.   Outcome: Progressing  Goal: Establishment of adequate milk supply with medication/procedure interruptions  Description: INTERVENTIONS:  - Review techniques for milk expression (breast pumping).   - Provide pumping equipment/supplies, instructions, and assistance until it is safe to breastfeed infant.  Outcome: Progressing  Goal: Appropriate maternal -  bonding  Description: INTERVENTIONS:  - Assess caregiver- interactions.  - Assess caregiver's emotional status and coping mechanisms.  - Encourage caregiver to participate in  daily care.  - Assess support systems available to mother/family.  - Provide /case management support as needed.  Outcome: Progressing      Problem: BIRTH - VAGINAL/ SECTION  Goal: Fetal and maternal status remain reassuring during the birth process  Description: INTERVENTIONS:  - Monitor vital signs  - Monitor fetal heart rate  - Monitor uterine activity  - Monitor labor progression (vaginal delivery)  - DVT prophylaxis (C/S delivery)  - Surgical antibiotic prophylaxis (C/S delivery)  Outcome: Completed     Problem: ANXIETY  Goal: Will report anxiety at manageable levels  Description: INTERVENTIONS:  - Administer medication as ordered  - Teach and rehearse alternative coping skills  - Provide emotional support with 1:1 interaction with staff  Outcome: Completed

## 2024-08-25 NOTE — ANESTHESIA POSTPROCEDURE EVALUATION
Patient: Cristina Saravia    Procedure Summary       Date: 08/24/24 Room / Location:     Anesthesia Start: 1542 Anesthesia Stop: 1943    Procedure: LABOR ANALGESIA Diagnosis:     Scheduled Providers:  Anesthesiologist: Isabel Mast MD    Anesthesia Type: epidural ASA Status: 2            Anesthesia Type: epidural    Vitals Value Taken Time   /73 08/24/24 2000   Temp 98.5 °F (36.9 °C) 08/24/24 1944   Pulse 80 08/24/24 2004   Resp  08/24/24 2014   SpO2 97 % 08/24/24 2004   Vitals shown include unfiled device data.    [facility] AN Post Evaluation:   Patient Evaluated in PACU  Patient Participation: complete - patient participated  Level of Consciousness: awake  Pain Management: adequate  Airway Patency:patent  Dental exam unchanged from preop  Yes    Cardiovascular Status: acceptable  Respiratory Status: acceptable  Postoperative Hydration acceptable      ISABEL MAST MD  8/24/2024 8:14 PM

## 2024-08-25 NOTE — PLAN OF CARE

## 2024-08-25 NOTE — L&D DELIVERY NOTE
Manjit, Girl [[id number]]      Labor Events     labor?: No   steroids?: None  Antibiotics received during labor?: Yes  Antibiotics (enter # doses in comment): ampicillin (Comment: #2)  Rupture date/time: 2024 0500     Rupture type: SROM  Fluid color: Clear  Labor type: Spontaneous Onset of Labor  Augmentation: Oxytocin  Indications for augmentation: Ineffective Contraction Pattern  Intrapartum & labor complications: Variable decelerations       Labor Length    1st stage: 13h 48m  2nd stage: 0h 51m  3rd stage: 0h 03m       Labor Event Times    Labor onset date/time: 2024 0500  Dilation complete date/time: 2024 1848  Start pushing date/time: 2024        Presentation    Presentation: Vertex  Position: Left Occiput Anterior       Operative Delivery    Operative Vaginal Delivery: No                Shoulder Dystocia    Shoulder Dystocia: No       Anesthesia    Method: Epidural               Delivery      Head delivery date/time: 2024 19:39:02   Delivery date/time:  24 19:39:09   Delivery type: Normal spontaneous vaginal delivery    Details:     Delivery location: delivery room  Delivery Room Temperature: 72       Delivery Providers    Delivering Clinician: Mary Alice Mittal MD   Delivery personnel:  Provider Role   Jena Shannon, BENITEZ Baby Nurse   Elise Sorenson, RN Delivery Nurse   Esme Avina Surgical Tech             Cord    Vessels: 3 Vessels  Complications: Knot  # of loops: 0  Timed cord clamping: Yes  Time in sec: 30  Cord blood disposition: to lab  Gases sent?: No       Resuscitation    Method: None       [place] Measurements      Weight: 3580 g 7 lb 14.3 oz Length: 51.4 cm     Head circum.: 34 cm              Placenta    Date/time: 2024  Removal: Spontaneous  Appearance: Intact  Disposition: Discarded       Apgars    Living status: Living   Apgar Scoring Key:    0 1 2    Skin color Blue or pale Acrocyanotic Completely pink     Heart rate Absent <100 bpm >100 bpm    Reflex irritability No response Grimace Cry or active withdrawal    Muscle tone Limp Some flexion Active motion    Respiratory effort Absent Weak cry; hypoventilation Good, crying              1 Minute:  5 Minute:  10 Minute:  15 Minute:  20 Minute:      Skin color: 1  1       Heart rate: 2  2       Reflex irritablity: 2  2       Muscle tone: 2  2       Respiratory effort: 2  2       Total: 9  9          Apgars assigned by: ARUN PISANO  Watts disposition: with mother       Skin to Skin    Skin to skin initiated date/time: 2024  Skin to skin with: Mother       Vaginal Count    Initial count RN: Livia Dexter RN  Initial count Tech: Ignacia Guardado   Sharps    Initial counts 10   0    Final counts 20   0    Final count RN: Elise Sorenson RN  Final count MD: Mary Alice Mittal MD       Lacerations    Episiotomy: None  Perineal lacerations: 2nd Repaired?: Yes     Vaginal laceration?: No      Cervical laceration?: No    Clitoral laceration?: No    Quantitative blood loss (mL): 300              [facility]  part of [facility]    Vaginal Delivery Note    Cristina Manjit Patient Status:  Inpatient    1990 MRN [id number]   Location [facility] Attending Mary Alice Mittal MD   Hosp Day # 0 PCP Fina Berg MD     Delivery     Surgeon: Mary Alice Mittal MD    Maternal Anesthesia: epidural     Infant  Date of Delivery: 2024    Time of Delivery: 7:39 PM   Delivery Type: Normal spontaneous vaginal delivery     Infant Sex  Information for the patient's :  Manjit, Girl [[id number]]   female     Infant Birthweight  Information for the patient's :  Manjit, Girl [[id number]]   7 lb 14.3 oz (3.58 kg)      Presentation Vertex [1]   Position Left [1]  Occiput [1]  Anterior [1]     Apgars:  1 minute: 9                5 minutes: 9                         10 minutes:      Placenta:  Date/Time of  Delivery: 8/24/2024  7:43 PM    Delivery: spontaneous  Placenta to Pathology: no    Umbilical Cord:  Cord Gases Submitted: no  Cord Blood/Tissue Collection: yes  Cord Complications: true knot  Sponge and Needle Counts:  Verified      Episiotomy/Laceration Repair  2nd degree perineal repaired with 2-0 Vicryl after adminstration of 1% lidocaine with good hemostasis and approximation.  Right ming minora separation medially (to the right of the urethra) repaired with 3-0 Vicryl after administration of 1% lidocaine with good approximation and hemostasis.    Delivery Complications  none    Neonatologist Present: no    Delivery Narrative: Patient pushed and delivered a live female (Aline) in OA position,over intact perineum.  Upon delivery of the fetal head, the neck was checked for a nuchal cord.   If needed, the nose and mouth were bulb suctioned.  Infant was delivered in total.     Delayed cord clamping was performed.  Umbilical cord was doubly clamped & cut.   Infant handed to awaiting mother with nurses at bedside.   Lacerations and repair as noted above  Cervix was inspected and no cervical lacerations or trailing membranes noted.  No uterine inversion noted.  No sulcus lacerations.   Placenta was delivered spontaneously intact & normal in appearance with 3 vessel cord.    ml    Mary Alice Mittal MD   8/24/2024  8:05 PM

## 2024-08-26 VITALS
SYSTOLIC BLOOD PRESSURE: 125 MMHG | RESPIRATION RATE: 16 BRPM | TEMPERATURE: 98 F | OXYGEN SATURATION: 97 % | DIASTOLIC BLOOD PRESSURE: 84 MMHG | HEART RATE: 88 BPM

## 2024-08-26 NOTE — DISCHARGE SUMMARY
[facility]  part of [facility]    Obstetrical Discharge Summary    Cristina Saravia Patient Status:  Inpatient    1990 MRN [id number]   Location [facility] 3SE Attending Mary Alice Mittal MD   Hosp Day # 2 PCP Fina Berg MD     Date of Admission: 2024     Date of Discharge: 2024    Admitting Diagnosis: pregnant  Pregnancy (HCC)    Discharge Diagnosis: .Active Problems:    Pregnancy (HCC)      Disposition: Home    Hospital Course:   Reason for Admission:  Cristina Saravia is a 34 year old  who was admitted with Estimated Date of Delivery: 24. She presented for labor management.  Pregnancy was complicated by:  Patient Active Problem List    Diagnosis Date Noted    Pregnancy ([facility]) 2024    COVID-19 affecting pregnancy, antepartum ([facility]) 2022    Rubella non-immune status, antepartum ([facility]) 2022    Supervision of normal first pregnancy, antepartum ([facility]) 2022    Rh negative state in antepartum period ([facility]) 2022       Hospital Course:  with routine peripartum course    Date of Delivery: 2024    Time of Delivery: 7:39 PM   Delivery Type: Normal spontaneous vaginal delivery     Live   Information for the patient's :  Manjit Girl [[id number]]   female  infant   Information for the patient's :  Hema Saravia [[id number]]   7 lb 14.3 oz (3.58 kg)    Apgars:  1 minute: 9                5 minutes: 9                         10 minutes:        Postpartum Course: Her postpartum course was uncomplicated except for asymptomatic anemia.    Discharge Physical Exam:   /84 (BP Location: Right arm)   Pulse 88   Temp 98 °F (36.7 °C) (Oral)   Resp 16   LMP 2023   SpO2 97%   Breastfeeding Yes   General appearance:  alert, appears stated age, cooperative and no distress  Abdominal: soft, non-tender, no rebound  Uterus: firm, nontender, below umbilicus  Pelvic: deferred  Extremities: Homans sign is negative, no sign  of DVT      Results:   Recent Results (from the past 336 hour(s))   CBC With Differential With Platelet    Collection Time: 08/24/24  1:51 PM   Result Value Ref Range    WBC 12.1 (H) 4.0 - 11.0 x10(3) uL    RBC 4.19 3.80 - 5.30 x10(6)uL    HGB 12.1 12.0 - 16.0 g/dL    HCT 35.6 35.0 - 48.0 %    MCV 85.0 80.0 - 100.0 fL    MCH 28.9 26.0 - 34.0 pg    MCHC 34.0 31.0 - 37.0 g/dL    RDW-SD 39.9 35.1 - 46.3 fL    RDW 13.1 11.0 - 15.0 %    .0 150.0 - 450.0 10(3)uL    Neutrophil Absolute Prelim 9.38 (H) 1.50 - 7.70 x10 (3) uL    Neutrophil Absolute 9.38 (H) 1.50 - 7.70 x10(3) uL    Lymphocyte Absolute 1.95 1.00 - 4.00 x10(3) uL    Monocyte Absolute 0.60 0.10 - 1.00 x10(3) uL    Eosinophil Absolute 0.08 0.00 - 0.70 x10(3) uL    Basophil Absolute 0.04 0.00 - 0.20 x10(3) uL    Immature Granulocyte Absolute 0.06 0.00 - 1.00 x10(3) uL    Neutrophil % 77.4 %    Lymphocyte % 16.1 %    Monocyte % 5.0 %    Eosinophil % 0.7 %    Basophil % 0.3 %    Immature Granulocyte % 0.5 %   T Pallidum Screening Cascade    Collection Time: 08/24/24  1:51 PM   Result Value Ref Range    Treponemal Antibodies Nonreactive Nonreactive    ABORH (Blood Type)    Collection Time: 08/24/24  1:51 PM   Result Value Ref Range    ABO BLOOD TYPE B     RH BLOOD TYPE Negative    Antibody Screen    Collection Time: 08/24/24  1:51 PM   Result Value Ref Range    Antibody Screen Negative    ABORH Confirmation    Collection Time: 08/24/24  2:35 PM   Result Value Ref Range    ABO BLOOD TYPE B     RH BLOOD TYPE Negative    Fetal Screen    Collection Time: 08/24/24  8:41 PM   Result Value Ref Range    Fetal Screen Result Negative    CBC With Differential With Platelet    Collection Time: 08/25/24  6:03 AM   Result Value Ref Range    WBC 15.6 (H) 4.0 - 11.0 x10(3) uL    RBC 3.62 (L) 3.80 - 5.30 x10(6)uL    HGB 10.8 (L) 12.0 - 16.0 g/dL    HCT 30.5 (L) 35.0 - 48.0 %    MCV 84.3 80.0 - 100.0 fL    MCH 29.8 26.0 - 34.0 pg    MCHC 35.4 31.0 - 37.0 g/dL    RDW-SD 39.2  35.1 - 46.3 fL    RDW 12.8 11.0 - 15.0 %    .0 150.0 - 450.0 10(3)uL    Neutrophil Absolute Prelim 12.00 (H) 1.50 - 7.70 x10 (3) uL    Neutrophil Absolute 12.00 (H) 1.50 - 7.70 x10(3) uL    Lymphocyte Absolute 2.26 1.00 - 4.00 x10(3) uL    Monocyte Absolute 1.04 (H) 0.10 - 1.00 x10(3) uL    Eosinophil Absolute 0.10 0.00 - 0.70 x10(3) uL    Basophil Absolute 0.07 0.00 - 0.20 x10(3) uL    Immature Granulocyte Absolute 0.08 0.00 - 1.00 x10(3) uL    Neutrophil % 77.2 %    Lymphocyte % 14.5 %    Monocyte % 6.7 %    Eosinophil % 0.6 %    Basophil % 0.5 %    Immature Granulocyte % 0.5 %   Rh Immune Globulin (Product Only) Once    Collection Time: 08/26/24  1:15 AM   Result Value Ref Range    DERIVATIVE CODE RHG     DERIVATIVE LOT [id number]     UNIT ABO      Product Status Presumed Transfused      No results for input(s): \"ABORH\" in the last 72 hours.  Recent Labs     08/24/24  1351 08/25/24  0603   WBC 12.1* 15.6*   HGB 12.1 10.8*   HCT 35.6 30.5*     No results found.    Complications: None    Discharge Plan:   Discharge Condition: Good    Discharge Meds:   Current Discharge Medication List        New Orders    Details   ibuprofen 600 MG Oral Tab Take 1 tablet (600 mg total) by mouth every 6 (six) hours as needed for Pain (moderate pain).           Home Meds - Unchanged    Details   prenatal vitamin with DHA 27-0.8-228 MG Oral Cap Take 1 capsule by mouth daily.                   Discharge Diet: As tolerated    Discharge Activity: Pelvic rest until cleared    Follow up:      Follow-up Information       Mary Alice Mittal MD Follow up in 6 week(s).    Specialty: OBSTETRICS & GYNECOLOGY  Why: Call the office to schedule an appointment in 6 weeks.  If you have any questions or concerns call the office.  Contact information:  [address] [address] [phone]                                   Other Discharge Instructions:                   Jaymie Cole MD  8/26/2024

## 2024-08-26 NOTE — PROGRESS NOTES
[facility]  part of [facility]    OB/Gyne Post  Progress Note      Cristina Saravia Patient Status:  Inpatient    1990 MRN [id number]   Location [facility] 3SE Attending Mary Alice Mittal MD   Hosp Day # 2 PCP Fina Berg MD       Subjective     Good pain control. Tolerating present diet. Ambulating well. Voiding freely.  She denies headache, fever, chest pain, shortness of breath, dizziness upon ambulation nor leg pain.     Objective   Vital signs in last 24 hours:  Temp:  [97.9 °F (36.6 °C)-98 °F (36.7 °C)] 98 °F (36.7 °C)  Pulse:  [72-88] 88  Resp:  [16] 16  BP: (114-125)/(83-84) 125/84    Input/Output:  No intake or output data in the 24 hours ending 24 0935    AVSS  Constitutional: comfortable  Abdomen: soft nontender  Uterus: fundus below umbilicus, Nontender  Extremities: No calf tenderness      Results:   Labs / Path / Radiology:    Recent Results (from the past 24 hour(s))   Rh Immune Globulin (Product Only) Once    Collection Time: 24  1:15 AM   Result Value Ref Range    DERIVATIVE CODE RHG     DERIVATIVE LOT [id number]     UNIT ABO      Product Status Presumed Transfused        Specimens (From admission, onward)      None            No results found.      Assessment/Plan   34 year oldyo  , s/p spontaneous vaginal, PPD# 2     Patient Active Problem List   Diagnosis    Supervision of normal first pregnancy, antepartum (HCC)    Rh negative state in antepartum period (HCC)    Rubella non-immune status, antepartum (HCC)    COVID-19 affecting pregnancy, antepartum (HCC)    Pregnancy (HCC)   .    discharge home with discharge instructions reviewed in detail        Jaymie Cole MD  2024  9:35 AM

## 2024-08-26 NOTE — PLAN OF CARE
Problem: PAIN - ADULT  Goal: Verbalizes/displays adequate comfort level or patient's stated pain goal  Description: INTERVENTIONS:  - Encourage pt to monitor pain and request assistance  - Assess pain using appropriate pain scale  - Administer analgesics based on type and severity of pain and evaluate response  - Implement non-pharmacological measures as appropriate and evaluate response  - Consider cultural and social influences on pain and pain management  - Manage/alleviate anxiety  - Utilize distraction and/or relaxation techniques  - Monitor for opioid side effects  - Notify MD/LIP if interventions unsuccessful or patient reports new pain  - Anticipate increased pain with activity and pre-medicate as appropriate  Outcome: Progressing     Problem: POSTPARTUM  Goal: Long Term Goal:Experiences normal postpartum course  Description: INTERVENTIONS:  - Assess and monitor vital signs and lab values.  - Assess fundus and lochia.  - Provide ice/sitz baths for perineum discomfort.  - Monitor healing of incision/episiotomy/laceration, and assess for signs and symptoms of infection and hematoma.  - Assess bladder function and monitor for bladder distention.  - Provide/instruct/assist with pericare as needed.  - Provide VTE prophylaxis as needed.  - Monitor bowel function.  - Encourage ambulation and provide assistance as needed.  - Assess and monitor emotional status and provide social service/psych resources as needed.  - Utilize standard precautions and use personal protective equipment as indicated. Ensure aseptic care of all intravenous lines and invasive tubes/drains.  - Obtain immunization and exposure to communicable diseases history.  Outcome: Progressing  Goal: Optimize infant feeding at the breast  Description: INTERVENTIONS:  - Initiate breast feeding within first hour after birth.   - Monitor effectiveness of current breast feeding efforts.  - Assess support systems available to mother/family.  - Identify  cultural beliefs/practices regarding lactation, letdown techniques, maternal food preferences.  - Assess mother's knowledge and previous experience with breast feeding.  - Provide information as needed about early infant feeding cues (e.g., rooting, lip smacking, sucking fingers/hand) versus late cue of crying.  - Discuss/demonstrate breast feeding aids (e.g., infant sling, nursing footstool/pillows, and breast pumps).  - Encourage mother/other family members to express feelings/concerns, and actively listen.  - Educate father/SO about benefits of breast feeding and how to manage common lactation challenges.  - Recommend avoidance of specific medications or substances incompatible with breast feeding.  - Assess and monitor for signs of nipple pain/trauma.  - Instruct and provide assistance with proper latch.  - Review techniques for milk expression (breast pumping) and storage of breast milk. Provide pumping equipment/supplies, instructions and assistance, as needed.  - Encourage rooming-in and breast feeding on demand.  - Encourage skin-to-skin contact.  - Provide LC support as needed.  - Assess for and manage engorgement.  - Provide breast feeding education handouts and information on community breast feeding support.   Outcome: Progressing  Goal: Establishment of adequate milk supply with medication/procedure interruptions  Description: INTERVENTIONS:  - Review techniques for milk expression (breast pumping).   - Provide pumping equipment/supplies, instructions, and assistance until it is safe to breastfeed infant.  Outcome: Progressing  Goal: Appropriate maternal -  bonding  Description: INTERVENTIONS:  - Assess caregiver- interactions.  - Assess caregiver's emotional status and coping mechanisms.  - Encourage caregiver to participate in  daily care.  - Assess support systems available to mother/family.  - Provide /case management support as needed.  Outcome: Progressing      Problem: Patient/Family Goals  Goal: Patient/Family Long Term Goal  Description: Patient's Long Term Goal:     Interventions:  -   - See additional Care Plan goals for specific interventions  Outcome: Progressing  Goal: Patient/Family Short Term Goal  Description: Patient's Short Term Goal:     Interventions:   -   - See additional Care Plan goals for specific interventions  Outcome: Progressing

## 2024-08-26 NOTE — DISCHARGE INSTRUCTIONS
-Pelvic rest for 6 weeks; no sex, tampons, douching, baths, or pools until after 6 weeks check-up.  -No heavy lifting; increase activity gradually.  -No driving if taking narcotics.    CALL YOUR PROVIDER IF:  Increased/heavy bleeding (I.e. Changing a saturated pad every hour).  New onset chills/fever greater than 100.4.  Pain in your vagina or abdomen that gets worse and isn't relieved with medicine.  Swelling or discharge with a bad odor from vagina.  Burning, pain, red streaks, or lumpy areas in your breasts that may be accompanied by flu-like symptoms.  Painful urination, or inability to control urination.  Nausea, vomiting, dizziness, or fainting.  Feelings of extreme sadness or anxiety, or a feeling that you don’t want to be with your baby.  Redness, warmth, or pain in the lower leg.  Chest pain or shortness of breath.  If : Check incision site AT LEAST 2x daily for signs and symptoms of infection (increased redness, warmth, tenderness, or drainage)    Mom & Baby Hour: Meets in person every WEDNESDAY at 10am at the [facility] in [place] ([address]) in the community education room. The group is for new moms and their babies up to 6 months of age. It includes breastfeeding supports with a certified lactation educator to be available to answer your breastfeeding questions.

## 2024-08-26 NOTE — PLAN OF CARE
Discharge Note  Discharge order received from MD. IV removed. Aware of follow up appointments. Discussed what to expect after discharge and when to call physician. went over discharge AVS with patient. Patient states understanding. Pt aware of pelvic rest for 6 weeks. Pt declined wheel chair and walked down. Prescriptions were sent to pharmacy.     Electronically sent prescriptions: ibuprofen  Printed Scripts: none      Witnessed mom sign release of custody form for infant.

## 2024-09-25 ENCOUNTER — TELEPHONE (OUTPATIENT)
Dept: LACTATION | Facility: HOSPITAL | Age: 34
End: 2024-09-25

## 2024-10-14 ENCOUNTER — TELEPHONE (OUTPATIENT)
Dept: OBGYN UNIT | Facility: HOSPITAL | Age: 34
End: 2024-10-14

## (undated) NOTE — LETTER
[facility]  Administration of Anesthesia  Cristina SCHULTE agree to be cared for by a physician anesthesiologist alone and/or with a nurse anesthetist, who is specially trained to monitor me and give me medicine to put me to sleep or keep me comfortable during my procedure    I understand that my anesthesiologist and/or anesthetist is not an employee or agent of [facility] or [facility]. He or she works for [facility]    As the patient asking for anesthesia services, I agree to:  Allow the anesthesiologist (anesthesia doctor) to give me medicine and do additional procedures as necessary. Some examples are: Starting or using an “IV” to give me medicine, fluids or blood during my procedure, and having a breathing tube placed to help me breathe when I’m asleep (intubation). In the event that my heart stops working properly, I understand that my anesthesiologist will make every effort to sustain my life, unless otherwise directed by [facility] Do Not Resuscitate documents.  Tell my anesthesia doctor before my procedure:  If I am pregnant.  The last time that I ate or drank.  iii. All of the medicines I take (including prescriptions, herbal supplements, and pills I can buy without a prescription (including street drugs/illegal medications). Failure to inform my anesthesiologist about these medicines may increase my risk of anesthetic complications.  iv.If I am allergic to anything or have had a reaction to anesthesia before.  I understand how the anesthesia medicine will help me (benefits).  I understand that with any type of anesthesia medicine there are risks:  The most common risks are: nausea, vomiting, sore throat, muscle soreness, damage to my eyes, mouth, or teeth (from breathing tube placement).  Rare risks include: remembering what happened during my procedure, allergic reactions to medications, injury to my airway, heart, lungs, vision, nerves, or  muscles and in extremely rare instances death.  My doctor has explained to me other choices available to me for my care (alternatives).  Pregnant Patients (“epidural”):  I understand that the risks of having an epidural (medicine given into my back to help control pain during labor), include itching, low blood pressure, difficulty urinating, headache or slowing of the baby’s heart. Very rare risks include infection, bleeding, seizure, irregular heart rhythms and nerve injury.  Regional Anesthesia (“spinal”, “epidural”, & “nerve blocks”):  I understand that rare but potential complications include headache, bleeding, infection, seizure, irregular heart rhythms, and nerve injury.    _____________________________________________________________________________  Patient (or Representative) Signature/Relationship to Patient  Date   Time    _____________________________________________________________________________   Name (if used)    Language/Organization   Time    _____________________________________________________________________________  Nurse Anesthetist Signature     Date   Time  _____________________________________________________________________________  Anesthesiologist Signature     Date   Time  I have discussed the procedure and information above with the patient (or patient’s representative) and answered their questions. The patient or their representative has agreed to have anesthesia services.    _____________________________________________________________________________  Witness        Date   Time  I have verified that the signature is that of the patient or patient’s representative, and that it was signed before the procedure  Patient Name: Cristina Saravia     : 1990                 Printed: 2024 at 1:35 PM    Medical Record #: [id number]                                            Page 1 of 1  ----------ANESTHESIA CONSENT----------